# Patient Record
Sex: FEMALE | URBAN - METROPOLITAN AREA
[De-identification: names, ages, dates, MRNs, and addresses within clinical notes are randomized per-mention and may not be internally consistent; named-entity substitution may affect disease eponyms.]

---

## 2018-04-12 ENCOUNTER — IMPORTED ENCOUNTER (OUTPATIENT)
Dept: URBAN - METROPOLITAN AREA CLINIC 9 | Facility: CLINIC | Age: 28
End: 2018-04-12

## 2018-09-25 ENCOUNTER — IMPORTED ENCOUNTER (OUTPATIENT)
Dept: URBAN - METROPOLITAN AREA CLINIC 9 | Facility: CLINIC | Age: 28
End: 2018-09-25

## 2018-09-27 ENCOUNTER — IMPORTED ENCOUNTER (OUTPATIENT)
Dept: URBAN - METROPOLITAN AREA CLINIC 9 | Facility: CLINIC | Age: 28
End: 2018-09-27

## 2018-09-28 ENCOUNTER — IMPORTED ENCOUNTER (OUTPATIENT)
Dept: URBAN - METROPOLITAN AREA CLINIC 9 | Facility: CLINIC | Age: 28
End: 2018-09-28

## 2018-10-03 ENCOUNTER — IMPORTED ENCOUNTER (OUTPATIENT)
Dept: URBAN - METROPOLITAN AREA CLINIC 9 | Facility: CLINIC | Age: 28
End: 2018-10-03

## 2018-10-29 ENCOUNTER — IMPORTED ENCOUNTER (OUTPATIENT)
Dept: URBAN - METROPOLITAN AREA CLINIC 9 | Facility: CLINIC | Age: 28
End: 2018-10-29

## 2018-12-19 ENCOUNTER — IMPORTED ENCOUNTER (OUTPATIENT)
Dept: URBAN - METROPOLITAN AREA CLINIC 9 | Facility: CLINIC | Age: 28
End: 2018-12-19

## 2019-03-05 ENCOUNTER — IMPORTED ENCOUNTER (OUTPATIENT)
Dept: URBAN - METROPOLITAN AREA CLINIC 9 | Facility: CLINIC | Age: 29
End: 2019-03-05

## 2021-10-16 ASSESSMENT — KERATOMETRY
OS_K1POWER_DIOPTERS: 43.75
OD_AXISANGLE2_DEGREES: 91
OS_AXISANGLE_DEGREES: 17
OD_K2POWER_DIOPTERS: 44
OS_AXISANGLE_DEGREES: 18
OS_K1POWER_DIOPTERS: 41
OS_AXISANGLE_DEGREES: 4
OS_K2POWER_DIOPTERS: 41
OD_AXISANGLE_DEGREES: 6
OD_K1POWER_DIOPTERS: 40.25
OS_AXISANGLE2_DEGREES: 86
OD_K1POWER_DIOPTERS: 43.25
OD_AXISANGLE2_DEGREES: 95
OS_AXISANGLE2_DEGREES: 108
OS_AXISANGLE_DEGREES: 176
OD_K1POWER_DIOPTERS: 43.25
OD_K2POWER_DIOPTERS: 41.75
OD_K2POWER_DIOPTERS: 41.25
OS_K2POWER_DIOPTERS: 41.75
OS_K1POWER_DIOPTERS: 40.5
OD_AXISANGLE2_DEGREES: 96
OD_AXISANGLE_DEGREES: 5
OD_K2POWER_DIOPTERS: 43.75
OS_K2POWER_DIOPTERS: 44.25
OS_K1POWER_DIOPTERS: 43.75
OS_K2POWER_DIOPTERS: 44.5
OD_AXISANGLE2_DEGREES: 97
OD_K1POWER_DIOPTERS: 40.75
OS_AXISANGLE2_DEGREES: 107
OS_AXISANGLE2_DEGREES: 94
OD_AXISANGLE_DEGREES: 7
OD_AXISANGLE_DEGREES: 1

## 2021-10-16 ASSESSMENT — VISUAL ACUITY
OD_SC: 20/15 SN
OD_CC: 20/25 SN
OS_SC: 20/15 SN
OD_CC: 20/15 SN
OS_SC: CF 2FT SN
OD_SC: 20/20 SN
OS_CC: 20/25 SN
OS_SC: 20/20 -2 SN
OS_SC: 20/20 SN
OS_SC: CF 2FT SN
OD_SC: 20/30 SN
OS_SC: 20/20 SN
OD_CC: 20/20 SN
OS_CC: 20/20 SN
OD_SC: CF 2FT SN
OD_SC: 20/15 SN
OS_CC: 20/20 SN
OD_CC: 20/20 SN
OS_CC: 20/20 SN
OS_CC: 20/25 SN
OS_SC: 20/25 SN
OD_SC: 20/25 -2 SN
OD_SC: CF 2FT SN

## 2021-10-16 ASSESSMENT — TONOMETRY
OS_IOP_MMHG: 12
OD_IOP_MMHG: 11

## 2021-10-16 ASSESSMENT — PACHYMETRY
OS_CT_UM: 529.0
OD_CT_UM: 528.0

## 2022-07-02 RX ORDER — MIRTAZAPINE 15 MG/1
TABLET, FILM COATED ORAL
COMMUNITY
Start: 2021-11-15 | End: 2022-09-12 | Stop reason: ALTCHOICE

## 2022-09-07 PROBLEM — F41.9 ANXIETY: Status: ACTIVE | Noted: 2022-09-07

## 2022-09-07 PROBLEM — N92.6 IRREGULAR MENSTRUAL CYCLE: Status: ACTIVE | Noted: 2022-09-07

## 2022-09-07 PROBLEM — D50.0 IRON DEFICIENCY ANEMIA DUE TO CHRONIC BLOOD LOSS: Status: ACTIVE | Noted: 2022-09-07

## 2022-09-07 PROBLEM — D50.9 MICROCYTIC ANEMIA: Status: ACTIVE | Noted: 2022-09-07

## 2022-09-07 PROBLEM — N92.1 MENORRHAGIA WITH IRREGULAR CYCLE: Status: ACTIVE | Noted: 2022-09-07

## 2022-09-07 PROBLEM — N93.9 ABNORMAL UTERINE BLEEDING: Status: ACTIVE | Noted: 2022-09-07

## 2022-09-07 PROBLEM — G93.32 CHRONIC FATIGUE SYNDROME: Status: ACTIVE | Noted: 2022-09-07

## 2022-09-07 PROBLEM — E55.9 VITAMIN D DEFICIENCY: Status: ACTIVE | Noted: 2022-09-07

## 2022-09-07 PROBLEM — K59.00 CONSTIPATION: Status: ACTIVE | Noted: 2022-09-07

## 2022-09-07 PROBLEM — E03.9 HYPOTHYROIDISM: Status: ACTIVE | Noted: 2022-09-07

## 2022-09-07 PROBLEM — K59.01 SLOW TRANSIT CONSTIPATION: Status: ACTIVE | Noted: 2022-09-07

## 2022-09-07 PROBLEM — K30 ACID INDIGESTION: Status: ACTIVE | Noted: 2022-09-07

## 2022-09-07 PROBLEM — N92.0 MENORRHAGIA: Status: ACTIVE | Noted: 2022-09-07

## 2022-09-07 PROBLEM — D25.1 FIBROIDS, INTRAMURAL: Status: ACTIVE | Noted: 2022-09-07

## 2022-09-07 PROBLEM — K29.50 CHRONIC GASTRITIS: Status: ACTIVE | Noted: 2022-09-07

## 2022-09-07 PROBLEM — K59.04 FUNCTIONAL CONSTIPATION: Status: ACTIVE | Noted: 2022-09-07

## 2022-09-07 PROBLEM — E22.1 HYPERPROLACTINEMIA (HCC): Status: ACTIVE | Noted: 2022-09-07
